# Patient Record
Sex: MALE | Race: ASIAN | NOT HISPANIC OR LATINO | URBAN - METROPOLITAN AREA
[De-identification: names, ages, dates, MRNs, and addresses within clinical notes are randomized per-mention and may not be internally consistent; named-entity substitution may affect disease eponyms.]

---

## 2023-12-27 ENCOUNTER — EMERGENCY (EMERGENCY)
Facility: HOSPITAL | Age: 73
LOS: 1 days | Discharge: HOME | End: 2023-12-27
Attending: EMERGENCY MEDICINE | Admitting: STUDENT IN AN ORGANIZED HEALTH CARE EDUCATION/TRAINING PROGRAM
Payer: MEDICAID

## 2023-12-27 VITALS
RESPIRATION RATE: 16 BRPM | TEMPERATURE: 98 F | SYSTOLIC BLOOD PRESSURE: 131 MMHG | HEART RATE: 58 BPM | OXYGEN SATURATION: 98 % | DIASTOLIC BLOOD PRESSURE: 69 MMHG

## 2023-12-27 LAB
ALBUMIN SERPL ELPH-MCNC: 4.3 G/DL — SIGNIFICANT CHANGE UP (ref 3.3–5)
ALBUMIN SERPL ELPH-MCNC: 4.3 G/DL — SIGNIFICANT CHANGE UP (ref 3.3–5)
ALP SERPL-CCNC: 55 U/L — SIGNIFICANT CHANGE UP (ref 40–120)
ALP SERPL-CCNC: 55 U/L — SIGNIFICANT CHANGE UP (ref 40–120)
ALT FLD-CCNC: 35 U/L — SIGNIFICANT CHANGE UP (ref 4–41)
ALT FLD-CCNC: 35 U/L — SIGNIFICANT CHANGE UP (ref 4–41)
ANION GAP SERPL CALC-SCNC: 9 MMOL/L — SIGNIFICANT CHANGE UP (ref 7–14)
ANION GAP SERPL CALC-SCNC: 9 MMOL/L — SIGNIFICANT CHANGE UP (ref 7–14)
APPEARANCE UR: CLEAR — SIGNIFICANT CHANGE UP
APPEARANCE UR: CLEAR — SIGNIFICANT CHANGE UP
AST SERPL-CCNC: 28 U/L — SIGNIFICANT CHANGE UP (ref 4–40)
AST SERPL-CCNC: 28 U/L — SIGNIFICANT CHANGE UP (ref 4–40)
BASE EXCESS BLDV CALC-SCNC: 1.9 MMOL/L — SIGNIFICANT CHANGE UP (ref -2–3)
BASE EXCESS BLDV CALC-SCNC: 1.9 MMOL/L — SIGNIFICANT CHANGE UP (ref -2–3)
BASOPHILS # BLD AUTO: 0.05 K/UL — SIGNIFICANT CHANGE UP (ref 0–0.2)
BASOPHILS # BLD AUTO: 0.05 K/UL — SIGNIFICANT CHANGE UP (ref 0–0.2)
BASOPHILS NFR BLD AUTO: 0.8 % — SIGNIFICANT CHANGE UP (ref 0–2)
BASOPHILS NFR BLD AUTO: 0.8 % — SIGNIFICANT CHANGE UP (ref 0–2)
BILIRUB SERPL-MCNC: 0.5 MG/DL — SIGNIFICANT CHANGE UP (ref 0.2–1.2)
BILIRUB SERPL-MCNC: 0.5 MG/DL — SIGNIFICANT CHANGE UP (ref 0.2–1.2)
BILIRUB UR-MCNC: NEGATIVE — SIGNIFICANT CHANGE UP
BILIRUB UR-MCNC: NEGATIVE — SIGNIFICANT CHANGE UP
BLOOD GAS VENOUS COMPREHENSIVE RESULT: SIGNIFICANT CHANGE UP
BLOOD GAS VENOUS COMPREHENSIVE RESULT: SIGNIFICANT CHANGE UP
BUN SERPL-MCNC: 8 MG/DL — SIGNIFICANT CHANGE UP (ref 7–23)
BUN SERPL-MCNC: 8 MG/DL — SIGNIFICANT CHANGE UP (ref 7–23)
CALCIUM SERPL-MCNC: 9.2 MG/DL — SIGNIFICANT CHANGE UP (ref 8.4–10.5)
CALCIUM SERPL-MCNC: 9.2 MG/DL — SIGNIFICANT CHANGE UP (ref 8.4–10.5)
CHLORIDE BLDV-SCNC: 105 MMOL/L — SIGNIFICANT CHANGE UP (ref 96–108)
CHLORIDE BLDV-SCNC: 105 MMOL/L — SIGNIFICANT CHANGE UP (ref 96–108)
CHLORIDE SERPL-SCNC: 104 MMOL/L — SIGNIFICANT CHANGE UP (ref 98–107)
CHLORIDE SERPL-SCNC: 104 MMOL/L — SIGNIFICANT CHANGE UP (ref 98–107)
CO2 BLDV-SCNC: 30.3 MMOL/L — HIGH (ref 22–26)
CO2 BLDV-SCNC: 30.3 MMOL/L — HIGH (ref 22–26)
CO2 SERPL-SCNC: 27 MMOL/L — SIGNIFICANT CHANGE UP (ref 22–31)
CO2 SERPL-SCNC: 27 MMOL/L — SIGNIFICANT CHANGE UP (ref 22–31)
COLOR SPEC: YELLOW — SIGNIFICANT CHANGE UP
COLOR SPEC: YELLOW — SIGNIFICANT CHANGE UP
CREAT SERPL-MCNC: 0.95 MG/DL — SIGNIFICANT CHANGE UP (ref 0.5–1.3)
CREAT SERPL-MCNC: 0.95 MG/DL — SIGNIFICANT CHANGE UP (ref 0.5–1.3)
DIFF PNL FLD: NEGATIVE — SIGNIFICANT CHANGE UP
DIFF PNL FLD: NEGATIVE — SIGNIFICANT CHANGE UP
EGFR: 85 ML/MIN/1.73M2 — SIGNIFICANT CHANGE UP
EGFR: 85 ML/MIN/1.73M2 — SIGNIFICANT CHANGE UP
EOSINOPHIL # BLD AUTO: 0.06 K/UL — SIGNIFICANT CHANGE UP (ref 0–0.5)
EOSINOPHIL # BLD AUTO: 0.06 K/UL — SIGNIFICANT CHANGE UP (ref 0–0.5)
EOSINOPHIL NFR BLD AUTO: 0.9 % — SIGNIFICANT CHANGE UP (ref 0–6)
EOSINOPHIL NFR BLD AUTO: 0.9 % — SIGNIFICANT CHANGE UP (ref 0–6)
GAS PNL BLDV: 136 MMOL/L — SIGNIFICANT CHANGE UP (ref 136–145)
GAS PNL BLDV: 136 MMOL/L — SIGNIFICANT CHANGE UP (ref 136–145)
GLUCOSE BLDV-MCNC: 96 MG/DL — SIGNIFICANT CHANGE UP (ref 70–99)
GLUCOSE BLDV-MCNC: 96 MG/DL — SIGNIFICANT CHANGE UP (ref 70–99)
GLUCOSE SERPL-MCNC: 101 MG/DL — HIGH (ref 70–99)
GLUCOSE SERPL-MCNC: 101 MG/DL — HIGH (ref 70–99)
GLUCOSE UR QL: NEGATIVE MG/DL — SIGNIFICANT CHANGE UP
GLUCOSE UR QL: NEGATIVE MG/DL — SIGNIFICANT CHANGE UP
HCO3 BLDV-SCNC: 29 MMOL/L — SIGNIFICANT CHANGE UP (ref 22–29)
HCO3 BLDV-SCNC: 29 MMOL/L — SIGNIFICANT CHANGE UP (ref 22–29)
HCT VFR BLD CALC: 44.8 % — SIGNIFICANT CHANGE UP (ref 39–50)
HCT VFR BLD CALC: 44.8 % — SIGNIFICANT CHANGE UP (ref 39–50)
HCT VFR BLDA CALC: 45 % — SIGNIFICANT CHANGE UP (ref 39–51)
HCT VFR BLDA CALC: 45 % — SIGNIFICANT CHANGE UP (ref 39–51)
HGB BLD CALC-MCNC: 15.1 G/DL — SIGNIFICANT CHANGE UP (ref 12.6–17.4)
HGB BLD CALC-MCNC: 15.1 G/DL — SIGNIFICANT CHANGE UP (ref 12.6–17.4)
HGB BLD-MCNC: 14.8 G/DL — SIGNIFICANT CHANGE UP (ref 13–17)
HGB BLD-MCNC: 14.8 G/DL — SIGNIFICANT CHANGE UP (ref 13–17)
IANC: 4.26 K/UL — SIGNIFICANT CHANGE UP (ref 1.8–7.4)
IANC: 4.26 K/UL — SIGNIFICANT CHANGE UP (ref 1.8–7.4)
IMM GRANULOCYTES NFR BLD AUTO: 0.3 % — SIGNIFICANT CHANGE UP (ref 0–0.9)
IMM GRANULOCYTES NFR BLD AUTO: 0.3 % — SIGNIFICANT CHANGE UP (ref 0–0.9)
KETONES UR-MCNC: NEGATIVE MG/DL — SIGNIFICANT CHANGE UP
KETONES UR-MCNC: NEGATIVE MG/DL — SIGNIFICANT CHANGE UP
LACTATE BLDV-MCNC: 1 MMOL/L — SIGNIFICANT CHANGE UP (ref 0.5–2)
LACTATE BLDV-MCNC: 1 MMOL/L — SIGNIFICANT CHANGE UP (ref 0.5–2)
LEUKOCYTE ESTERASE UR-ACNC: NEGATIVE — SIGNIFICANT CHANGE UP
LEUKOCYTE ESTERASE UR-ACNC: NEGATIVE — SIGNIFICANT CHANGE UP
LYMPHOCYTES # BLD AUTO: 1.57 K/UL — SIGNIFICANT CHANGE UP (ref 1–3.3)
LYMPHOCYTES # BLD AUTO: 1.57 K/UL — SIGNIFICANT CHANGE UP (ref 1–3.3)
LYMPHOCYTES # BLD AUTO: 24.4 % — SIGNIFICANT CHANGE UP (ref 13–44)
LYMPHOCYTES # BLD AUTO: 24.4 % — SIGNIFICANT CHANGE UP (ref 13–44)
MCHC RBC-ENTMCNC: 30.3 PG — SIGNIFICANT CHANGE UP (ref 27–34)
MCHC RBC-ENTMCNC: 30.3 PG — SIGNIFICANT CHANGE UP (ref 27–34)
MCHC RBC-ENTMCNC: 33 GM/DL — SIGNIFICANT CHANGE UP (ref 32–36)
MCHC RBC-ENTMCNC: 33 GM/DL — SIGNIFICANT CHANGE UP (ref 32–36)
MCV RBC AUTO: 91.6 FL — SIGNIFICANT CHANGE UP (ref 80–100)
MCV RBC AUTO: 91.6 FL — SIGNIFICANT CHANGE UP (ref 80–100)
MONOCYTES # BLD AUTO: 0.48 K/UL — SIGNIFICANT CHANGE UP (ref 0–0.9)
MONOCYTES # BLD AUTO: 0.48 K/UL — SIGNIFICANT CHANGE UP (ref 0–0.9)
MONOCYTES NFR BLD AUTO: 7.5 % — SIGNIFICANT CHANGE UP (ref 2–14)
MONOCYTES NFR BLD AUTO: 7.5 % — SIGNIFICANT CHANGE UP (ref 2–14)
NEUTROPHILS # BLD AUTO: 4.26 K/UL — SIGNIFICANT CHANGE UP (ref 1.8–7.4)
NEUTROPHILS # BLD AUTO: 4.26 K/UL — SIGNIFICANT CHANGE UP (ref 1.8–7.4)
NEUTROPHILS NFR BLD AUTO: 66.1 % — SIGNIFICANT CHANGE UP (ref 43–77)
NEUTROPHILS NFR BLD AUTO: 66.1 % — SIGNIFICANT CHANGE UP (ref 43–77)
NITRITE UR-MCNC: NEGATIVE — SIGNIFICANT CHANGE UP
NITRITE UR-MCNC: NEGATIVE — SIGNIFICANT CHANGE UP
NRBC # BLD: 0 /100 WBCS — SIGNIFICANT CHANGE UP (ref 0–0)
NRBC # BLD: 0 /100 WBCS — SIGNIFICANT CHANGE UP (ref 0–0)
NRBC # FLD: 0 K/UL — SIGNIFICANT CHANGE UP (ref 0–0)
NRBC # FLD: 0 K/UL — SIGNIFICANT CHANGE UP (ref 0–0)
PCO2 BLDV: 52 MMHG — SIGNIFICANT CHANGE UP (ref 42–55)
PCO2 BLDV: 52 MMHG — SIGNIFICANT CHANGE UP (ref 42–55)
PH BLDV: 7.35 — SIGNIFICANT CHANGE UP (ref 7.32–7.43)
PH BLDV: 7.35 — SIGNIFICANT CHANGE UP (ref 7.32–7.43)
PH UR: 8 — SIGNIFICANT CHANGE UP (ref 5–8)
PH UR: 8 — SIGNIFICANT CHANGE UP (ref 5–8)
PLATELET # BLD AUTO: 214 K/UL — SIGNIFICANT CHANGE UP (ref 150–400)
PLATELET # BLD AUTO: 214 K/UL — SIGNIFICANT CHANGE UP (ref 150–400)
PO2 BLDV: 30 MMHG — SIGNIFICANT CHANGE UP (ref 25–45)
PO2 BLDV: 30 MMHG — SIGNIFICANT CHANGE UP (ref 25–45)
POTASSIUM BLDV-SCNC: 4.8 MMOL/L — SIGNIFICANT CHANGE UP (ref 3.5–5.1)
POTASSIUM BLDV-SCNC: 4.8 MMOL/L — SIGNIFICANT CHANGE UP (ref 3.5–5.1)
POTASSIUM SERPL-MCNC: 4.6 MMOL/L — SIGNIFICANT CHANGE UP (ref 3.5–5.3)
POTASSIUM SERPL-MCNC: 4.6 MMOL/L — SIGNIFICANT CHANGE UP (ref 3.5–5.3)
POTASSIUM SERPL-SCNC: 4.6 MMOL/L — SIGNIFICANT CHANGE UP (ref 3.5–5.3)
POTASSIUM SERPL-SCNC: 4.6 MMOL/L — SIGNIFICANT CHANGE UP (ref 3.5–5.3)
PROT SERPL-MCNC: 7.4 G/DL — SIGNIFICANT CHANGE UP (ref 6–8.3)
PROT SERPL-MCNC: 7.4 G/DL — SIGNIFICANT CHANGE UP (ref 6–8.3)
PROT UR-MCNC: SIGNIFICANT CHANGE UP MG/DL
PROT UR-MCNC: SIGNIFICANT CHANGE UP MG/DL
RBC # BLD: 4.89 M/UL — SIGNIFICANT CHANGE UP (ref 4.2–5.8)
RBC # BLD: 4.89 M/UL — SIGNIFICANT CHANGE UP (ref 4.2–5.8)
RBC # FLD: 12.9 % — SIGNIFICANT CHANGE UP (ref 10.3–14.5)
RBC # FLD: 12.9 % — SIGNIFICANT CHANGE UP (ref 10.3–14.5)
SAO2 % BLDV: 39.4 % — LOW (ref 67–88)
SAO2 % BLDV: 39.4 % — LOW (ref 67–88)
SODIUM SERPL-SCNC: 140 MMOL/L — SIGNIFICANT CHANGE UP (ref 135–145)
SODIUM SERPL-SCNC: 140 MMOL/L — SIGNIFICANT CHANGE UP (ref 135–145)
SP GR SPEC: 1.02 — SIGNIFICANT CHANGE UP (ref 1–1.03)
SP GR SPEC: 1.02 — SIGNIFICANT CHANGE UP (ref 1–1.03)
TROPONIN T, HIGH SENSITIVITY RESULT: 7 NG/L — SIGNIFICANT CHANGE UP
TROPONIN T, HIGH SENSITIVITY RESULT: 7 NG/L — SIGNIFICANT CHANGE UP
UROBILINOGEN FLD QL: 1 MG/DL — SIGNIFICANT CHANGE UP (ref 0.2–1)
UROBILINOGEN FLD QL: 1 MG/DL — SIGNIFICANT CHANGE UP (ref 0.2–1)
WBC # BLD: 6.44 K/UL — SIGNIFICANT CHANGE UP (ref 3.8–10.5)
WBC # BLD: 6.44 K/UL — SIGNIFICANT CHANGE UP (ref 3.8–10.5)
WBC # FLD AUTO: 6.44 K/UL — SIGNIFICANT CHANGE UP (ref 3.8–10.5)
WBC # FLD AUTO: 6.44 K/UL — SIGNIFICANT CHANGE UP (ref 3.8–10.5)

## 2023-12-27 PROCEDURE — 99223 1ST HOSP IP/OBS HIGH 75: CPT

## 2023-12-27 PROCEDURE — 70498 CT ANGIOGRAPHY NECK: CPT | Mod: 26,MA

## 2023-12-27 PROCEDURE — 70496 CT ANGIOGRAPHY HEAD: CPT | Mod: 26,MA

## 2023-12-27 PROCEDURE — 93010 ELECTROCARDIOGRAM REPORT: CPT

## 2023-12-27 PROCEDURE — 70450 CT HEAD/BRAIN W/O DYE: CPT | Mod: 26,59,MA

## 2023-12-27 RX ORDER — AMLODIPINE BESYLATE 2.5 MG/1
2.5 TABLET ORAL DAILY
Refills: 0 | Status: DISCONTINUED | OUTPATIENT
Start: 2023-12-27 | End: 2023-12-30

## 2023-12-27 RX ORDER — METOCLOPRAMIDE HCL 10 MG
10 TABLET ORAL EVERY 6 HOURS
Refills: 0 | Status: DISCONTINUED | OUTPATIENT
Start: 2023-12-27 | End: 2023-12-30

## 2023-12-27 RX ORDER — MECLIZINE HCL 12.5 MG
25 TABLET ORAL ONCE
Refills: 0 | Status: COMPLETED | OUTPATIENT
Start: 2023-12-27 | End: 2023-12-27

## 2023-12-27 RX ORDER — ACETAMINOPHEN 500 MG
975 TABLET ORAL EVERY 6 HOURS
Refills: 0 | Status: DISCONTINUED | OUTPATIENT
Start: 2023-12-27 | End: 2023-12-30

## 2023-12-27 RX ORDER — FINASTERIDE 5 MG/1
5 TABLET, FILM COATED ORAL DAILY
Refills: 0 | Status: DISCONTINUED | OUTPATIENT
Start: 2023-12-27 | End: 2023-12-30

## 2023-12-27 RX ORDER — METOCLOPRAMIDE HCL 10 MG
10 TABLET ORAL ONCE
Refills: 0 | Status: COMPLETED | OUTPATIENT
Start: 2023-12-27 | End: 2023-12-27

## 2023-12-27 RX ORDER — MECLIZINE HCL 12.5 MG
12.5 TABLET ORAL THREE TIMES A DAY
Refills: 0 | Status: DISCONTINUED | OUTPATIENT
Start: 2023-12-27 | End: 2023-12-30

## 2023-12-27 RX ORDER — TAMSULOSIN HYDROCHLORIDE 0.4 MG/1
0.4 CAPSULE ORAL AT BEDTIME
Refills: 0 | Status: DISCONTINUED | OUTPATIENT
Start: 2023-12-27 | End: 2023-12-30

## 2023-12-27 RX ORDER — LISINOPRIL 2.5 MG/1
2.5 TABLET ORAL DAILY
Refills: 0 | Status: DISCONTINUED | OUTPATIENT
Start: 2023-12-27 | End: 2023-12-30

## 2023-12-27 RX ORDER — ATORVASTATIN CALCIUM 80 MG/1
40 TABLET, FILM COATED ORAL AT BEDTIME
Refills: 0 | Status: DISCONTINUED | OUTPATIENT
Start: 2023-12-27 | End: 2023-12-30

## 2023-12-27 RX ORDER — CLOPIDOGREL BISULFATE 75 MG/1
75 TABLET, FILM COATED ORAL DAILY
Refills: 0 | Status: DISCONTINUED | OUTPATIENT
Start: 2023-12-27 | End: 2023-12-30

## 2023-12-27 RX ADMIN — Medication 10 MILLIGRAM(S): at 14:28

## 2023-12-27 RX ADMIN — Medication 25 MILLIGRAM(S): at 12:28

## 2023-12-27 NOTE — ED PROVIDER NOTE - PROGRESS NOTE DETAILS
BESSIE Castillo: CTa head/neck without acute findings, pt continues to have dizziness, will trial reglan. Spoke with neuro will consult in ED given persistent dizziness hx of CVA BESSIE Castillo: Neuro recommending CDU for MRI brain and IACs, pt signed out awaiting CDU evaluation

## 2023-12-27 NOTE — ED ADULT NURSE REASSESSMENT NOTE - NS ED NURSE REASSESS COMMENT FT1
Pt received at change of shift. Pt laying in bed, NAD, respirations even and unlabored. Report given to CDU Danica IBARRA, awaiting transport by PCA.

## 2023-12-27 NOTE — ED ADULT NURSE NOTE - OBJECTIVE STATEMENT
Beata RN: Pt received to 6a, awake and alert, A&OX4, ambulatory. C/o dizziness x2 weeks, worsening yesterday. Reports increased dizziness and difficulty projecting his voice while praying. Reports feeling better at this time but has had intermittent dizziness today. Recent prescribed Meclizine for dizziness. No neuro deficits. Respirations even and unlabored. Denies CP, SOB, N/V, HA,  palpitations, blurry vision. 18G IV placed to R AC. Bed in lowest position, call bell within reach. Safety maintained. Report given to primary RN.

## 2023-12-27 NOTE — ED PROVIDER NOTE - OBJECTIVE STATEMENT
73yM w/pmhx CVA (6 months ago), CAD w/stents on plavix, HTN, HLD, pre-DM (on metformin), BPH presenting to the ED with dizziness. Son at bedside is providing translation per patient request. States yesterday when pt went to stand and speak during prayer he suddenly felt dizzy and generalized weakness. Since then pt has been experiencing dizziness. States since CVA 6 months ago he has intermittent dizziness and was prescribed meclizine which he has ran out of. States he feels pressure to the top of his head. Also he reports burning chest pain with eating and burning to the bottom of his feet. Pt denies fever/chills, neck pain, ear pain, URI symptoms, sob, palpitations, numbness/tingling, difficulty speaking or swallowing, recent illness or any other concerns.

## 2023-12-27 NOTE — ED PROVIDER NOTE - ATTENDING APP SHARED VISIT CONTRIBUTION OF CARE
Dr. Max:  I performed a face to face bedside interview with patient regarding history of present illness, review of symptoms and past medical history. I completed an independent physical exam.  I have discussed patient's plan of care with PA.   I agree with note as stated above, having amended the EMR as needed to reflect my findings.   This includes HISTORY OF PRESENT ILLNESS, HIV, PAST MEDICAL/SURGICAL/FAMILY/SOCIAL HISTORY, ALLERGIES AND HOME MEDICATIONS, REVIEW OF SYSTEMS, PHYSICAL EXAM, and any PROGRESS NOTES during the time I functioned as the attending physician for this patient.    73M h/[place] was [place] to [place] and symptoms resolved), CAD on Plavix, HTN, HLD, pre-DM, BPH, presents c/o dizziness which he describes as worse when standing up and feels like room spinning.  States he's had intermittent dizziness since his stroke, was prescribed meclizine.  Also c/o pressure sensation to top of head.    Exam:  - nad  - rrr  - ctab  - abd soft ntnd  - no focal neuro deficits    A/P  - dizziness consistent with vertigo, eval peripheral vs centra  - cbc, cmp, trop, CTA head/neck  - neuro consult

## 2023-12-27 NOTE — ED ADULT NURSE REASSESSMENT NOTE - NS ED NURSE REASSESS COMMENT FT1
Pt A+Ox4.  Pt [place]ian speaking but son at bedside and he wishes for him to translate.  Pt in NAD.  Pt lungs clear, equal and unlabored. Pt given lunch and he ate a full tray.  Fall precautions maintained.  Will continue to monitor. Pt A+Ox4.  Pt Hebrew speaking but son at bedside and he wishes for him to translate.  Pt in NAD.  Pt lungs clear, equal and unlabored. Pt given lunch and he ate a full tray.  Fall precautions maintained.  Will continue to monitor.

## 2023-12-27 NOTE — ED PROVIDER NOTE - PHYSICAL EXAMINATION
neuro: A&Ox3, PERRL, CN II-VII intact, sensation intact and equal b/l, strength 5/5 in all extremities, normal rapid alternating movements, cautious gait with assistance

## 2023-12-27 NOTE — ED CDU PROVIDER INITIAL DAY NOTE - NSICDXPASTMEDICALHX_GEN_ALL_CORE_FT
PAST MEDICAL HISTORY:  CAD (coronary artery disease)     CVA (cerebrovascular accident)     HLD (hyperlipidemia)     HTN (hypertension)      PAST MEDICAL HISTORY:  CAD (coronary artery disease)     CVA (cerebrovascular accident)     History of BPH     HLD (hyperlipidemia)     HTN (hypertension)

## 2023-12-27 NOTE — ED CDU PROVIDER INITIAL DAY NOTE - PHYSICAL EXAMINATION
CONSTITUTIONAL: Well-appearing; well-nourished; in no apparent distress. Non-toxic appearing.   NEURO: Alert, oriented x 3. Gait steady without assistance. No facial droop. Tongue protrudes midline. Strength to upper and lower extremities 5/5. No pronator drift.   PSYCH: Mood appropriate. Thought processes intact.   NECK: Supple  CARD: Regular rate and rhythm, no murmurs  RESP: No accessory muscle use; breath sounds clear and equal bilaterally; no wheezes, rhonchi, or rales     ABD: Soft; non-distended; non-tender. No guarding or rebound.   MUSCULOSKELETAL/EXTREMITIES: FROM in all four extremities; no extremity edema.  SKIN: Warm; dry; no apparent lesions or exudate

## 2023-12-27 NOTE — ED PROVIDER NOTE - NSICDXPASTMEDICALHX_GEN_ALL_CORE_FT
PAST MEDICAL HISTORY:  CAD (coronary artery disease)     CVA (cerebrovascular accident)     HLD (hyperlipidemia)     HTN (hypertension)

## 2023-12-27 NOTE — CONSULT NOTE ADULT - ATTENDING COMMENTS
Seen and examined on rounds     chart and imaging reviewed    73m prior stroke in [facility]   is on plavix for cardiac stents, statin  presents with worsened vertigo after getting up from praying   not helped by meclizine  this morning worsened by rolling over in stretcher  MRI reviewed personally no acute infarct or CP angle lesion/IAC lesion    On exam he is alert, oriented, no aphasia  no nystagmus on my exam, remainder of CN intact  motor and sensory function in detail intact  stands and walks on own  coordination intact  DTRs diminished    peripheral vertigo  discussed vestib rehab  also discussed secondary stroke prevention, we will set him up with outpatient neuro  continue plavix   ok to dc Seen and examined on rounds     chart and imaging reviewed    73m prior stroke in [facility]   is on plavix for cardiac stents, statin  presents with worsened vertigo after getting up from praying   not helped by meclizine  this morning worsened by rolling over in stretcher  MRI reviewed personally no acute infarct or CP angle lesion/IAC lesion    On exam he is alert, oriented, no aphasia  no nystagmus on my exam, remainder of CN intact  motor and sensory function in detail intact  stands and walks on own  coordination intact  DTRs diminished    peripheral vertigo  discussed vestib rehab  also discussed secondary stroke prevention, we will set him up with outpatient neuro  continue plavix   ok to dc

## 2023-12-27 NOTE — ED CDU PROVIDER INITIAL DAY NOTE - CLINICAL SUMMARY MEDICAL DECISION MAKING FREE TEXT BOX
74 y/o man with a PMHx significant for stroke (L hemipareis, speech disturbance) in June 2023 with no residual deficits, CAD s/p stents on plavix, HTN, HLD, pre-DM, BPH, chronic intermittent dizziness on Meclizine, who presents with acute worsening of dizziness. In the emergency department patient underwent lab analysis and CTs.  Labs unremarkable.  CTA head/neck without acute intracranial hemorrhage, mass effect, or shift of midline structures nor large vessel acute occlusion or major stenosis.  Patient placed in CDU for MRI Brain and IACs, tele monitoring, and frequent reassessment.

## 2023-12-27 NOTE — CONSULT NOTE ADULT - ASSESSMENT
Mr. MENDOZA is a 73y (1950) man with a PMHx significant for stroke (L hemipareis, speech disturbance) in June 2023 with no residual deficits, CAD s/p stents on plavix, HTN, HLD, pre-DM, BPH, chronic intermittent dizziness on Meclizine, who presents with acute worsening of dizziness.    Impression:    1. Intermittent and recurrent episodes of dizziness associated with gait instability with concern for acute vestibular syndrome. Given exam and history, appears to be peripheral etiology of vertigo, however patient does have vascular risk factors. Will also need to consider orthostatic hypotension as cause of potential causes dizziness in setting of polypharmacy   2. Reported history of stroke 6 months ago with left sided hemiparesis and speech disturbance, symptoms completely resolved per patient     Recommendations:    [] ???CDU for MRI brain w/ and w/o IAC protocol  [] Meclizine 12.5mg TID PRN  [] EKG, telemetry, orthostatic vitals  [] Continue plavix 75mg daily and atorvastatin 40mg qhs for secondary stroke prevention given history of stroke   [] PT/OT as tolerated   [] Patient will likely benefit from outpatient vestibular rehab. Can   [] Follow up with Neurology as outpatient. Patient can follow up with Dr. Chauncey Massey after discharge. Please instruct the patient/family to call [phone] to schedule an appointment within the next 1-2 weeks. Office is located at [address] [place] [address].      Case to be seen and discussed with Neurology attending Dr. Gillespie, please await attending attestation.  Mr. MENDOZA is a 73y (1950) man with a PMHx significant for stroke (L hemipareis, speech disturbance) in June 2023 with no residual deficits, CAD s/p stents on plavix, HTN, HLD, pre-DM, BPH, chronic intermittent dizziness on Meclizine, who presents with acute worsening of dizziness.    Impression:    1. Intermittent and recurrent episodes of dizziness associated with gait instability with concern for acute vestibular syndrome. Given exam and history, appears to be peripheral etiology of vertigo, however patient does have vascular risk factors. Will also need to consider orthostatic hypotension as cause of potential causes dizziness in setting of polypharmacy   2. Reported history of stroke 6 months ago with left sided hemiparesis and speech disturbance, symptoms completely resolved per patient     Recommendations:    [] ???CDU for MRI brain w/ and w/o IAC protocol  [] Meclizine 12.5mg TID PRN  [] EKG, telemetry, orthostatic vitals  [] Continue plavix 75mg daily and atorvastatin 40mg qhs for secondary stroke prevention given history of stroke   [] PT/OT as tolerated   [] Patient will likely benefit from outpatient vestibular rehab. Can   [] Follow up with Neurology as outpatient. Patient can follow up with Dr. Chauncey Massey after discharge. Please instruct the patient/family to call [phone] to schedule an appointment within the next 1-2 weeks. Office is located at [address] [place] [address].      Case to be seen and discussed with Neurology attending Dr. Gillespie, please await attending attestation.  Mr. MENDOZA is a 73y (1950) man with a PMHx significant for stroke (L hemipareis, speech disturbance) in June 2023 with no residual deficits, CAD s/p stents on plavix, HTN, HLD, pre-DM, BPH, chronic intermittent dizziness on Meclizine, who presents with acute worsening of dizziness.    Impression:    1. Intermittent and recurrent episodes of dizziness associated with gait instability with concern for acute vestibular syndrome. Given exam and history, appears to be peripheral etiology of vertigo, however patient does have vascular risk factors. Will also need to consider orthostatic hypotension as cause of potential causes dizziness in setting of polypharmacy   2. Reported history of stroke 6 months ago with left sided hemiparesis and speech disturbance, symptoms completely resolved per patient     Recommendations:    [] ???CDU for MRI brain w/ and w/o IAC protocol  [] Meclizine 12.5mg TID PRN  [] EKG, telemetry, orthostatic vitals  [] Continue plavix 75mg daily and atorvastatin 40mg qhs for secondary stroke prevention given history of stroke   [] PT/OT as tolerated   [] Patient will likely benefit from outpatient vestibular rehab. Can follow at [facility], patient can call [phone] to make an appointment.   [] Follow up with Neurology as outpatient. Patient can follow up with Dr. Chauncey Massey after discharge. Please instruct the patient/family to call [phone] to schedule an appointment within the next 1-2 weeks. Office is located at [address] [place] [address], [place] [address].      Case to be seen and discussed with Neurology attending Dr. Gillespie, please await attending attestation.  Mr. MENDOZA is a 73y (1950) man with a PMHx significant for stroke (L hemipareis, speech disturbance) in June 2023 with no residual deficits, CAD s/p stents on plavix, HTN, HLD, pre-DM, BPH, chronic intermittent dizziness on Meclizine, who presents with acute worsening of dizziness.    Impression:    1. Intermittent and recurrent episodes of dizziness associated with gait instability with concern for acute vestibular syndrome. Given exam and history, appears to be peripheral etiology of vertigo, however patient does have vascular risk factors. Will also need to consider orthostatic hypotension as cause of potential causes dizziness in setting of polypharmacy   2. Reported history of stroke 6 months ago with left sided hemiparesis and speech disturbance, symptoms completely resolved per patient     Recommendations:    [] ???CDU for MRI brain w/ and w/o IAC protocol  [] Meclizine 12.5mg TID PRN  [] EKG, telemetry, orthostatic vitals  [] Continue plavix 75mg daily and atorvastatin 40mg qhs for secondary stroke prevention given history of stroke   [] PT/OT as tolerated   [] Patient will likely benefit from outpatient vestibular rehab. Can follow at [facility], patient can call [phone] to make an appointment.   [] Follow up with Neurology as outpatient. Patient can follow up with Dr. Chauncey Massey after discharge. Please instruct the patient/family to call [phone] to schedule an appointment within the next 1-2 weeks. Office is located at [address] [place] [address], [place] [address].      Case to be seen and discussed with Neurology attending Dr. Gillespie, please await attending attestation.  Mr. MENDOZA is a 73y (1950) man with a PMHx significant for stroke (L hemipareis, speech disturbance) in June 2023 with no residual deficits, CAD s/p stents on plavix, HTN, HLD, pre-DM, BPH, chronic intermittent dizziness on Meclizine, who presents with acute worsening of dizziness.    Impression:    1. Intermittent and recurrent episodes of dizziness associated with gait instability with concern for acute vestibular syndrome. Given exam and history, appears to be peripheral etiology of vertigo, however patient does have vascular risk factors. Will also need to consider orthostatic hypotension as cause of potential causes dizziness in setting of polypharmacy   2. Reported history of stroke 6 months ago with left sided hemiparesis and speech disturbance, symptoms completely resolved per patient     Recommendations:    [] CDU for MRI brain w/ and w/o IAC protocol  [] Meclizine 12.5mg TID PRN  [] EKG, telemetry, orthostatic vitals  [] Continue plavix 75mg daily and atorvastatin 40mg qhs for secondary stroke prevention given history of stroke   [] PT/OT as tolerated   [] Patient will likely benefit from outpatient vestibular rehab. Can follow at [facility], patient can call [phone] to make an appointment.   [] Follow up with Neurology as outpatient. Patient can follow up with Dr. Chauncey Massey after discharge. Please instruct the patient/family to call [phone] to schedule an appointment within the next 1-2 weeks. Office is located at [address] [place] [address].      Case discussed with Neurology attending Dr. Gillespie. To be seen on AM rounds, please await attending attestation.  Mr. MENDOZA is a 73y (1950) man with a PMHx significant for stroke (L hemipareis, speech disturbance) in June 2023 with no residual deficits, CAD s/p stents on plavix, HTN, HLD, pre-DM, BPH, chronic intermittent dizziness on Meclizine, who presents with acute worsening of dizziness.    Impression:    1. Intermittent and recurrent episodes of dizziness associated with gait instability with concern for acute vestibular syndrome. Given exam and history, appears to be peripheral etiology of vertigo, however patient does have vascular risk factors. Will also need to consider orthostatic hypotension as cause of potential causes dizziness in setting of polypharmacy   2. Reported history of stroke 6 months ago with left sided hemiparesis and speech disturbance, symptoms completely resolved per patient     Recommendations:    [] CDU for MRI brain w/ and w/o IAC protocol  [] Meclizine 12.5mg TID PRN  [] EKG, telemetry, orthostatic vitals  [] Continue plavix 75mg daily and atorvastatin 40mg qhs for secondary stroke prevention given history of stroke   [] PT/OT as tolerated   [] Patient will likely benefit from outpatient vestibular rehab. Can follow at [facility], patient can call [phone] to make an appointment.   [] Follow up with Neurology as outpatient. Patient can follow up with Dr. Chauncey Massey after discharge. Please instruct the patient/family to call [phone] to schedule an appointment within the next 1-2 weeks. Office is located at [address] [place] [address].      Case discussed with Neurology attending Dr. Gillespie. To be seen on AM rounds, please await attending attestation.  Mr. MENDOZA is a 73y (1950) man with a PMHx significant for stroke (L hemipareis, speech disturbance) in June 2023 with no residual deficits, CAD s/p stents on plavix, HTN, HLD, pre-DM, BPH, chronic intermittent dizziness on Meclizine, who presents with acute worsening of dizziness.      Impression:    1. Intermittent and recurrent episodes of dizziness associated with gait instability with concern for acute vestibular syndrome. Given exam and history, appears to be peripheral etiology of vertigo, however patient does have vascular risk factors. Will also need to consider orthostatic hypotension as cause of potential causes dizziness in setting of polypharmacy   2. Reported history of stroke 6 months ago with left sided hemiparesis and speech disturbance, symptoms completely resolved per patient     Recommendations:    [] CDU for MRI brain w/ and w/o IAC protocol  [] Meclizine 12.5mg TID PRN  [] EKG, telemetry, orthostatic vitals  [] Continue plavix 75mg daily and atorvastatin 40mg qhs for secondary stroke prevention given history of stroke   [] PT/OT as tolerated   [] Patient will likely benefit from outpatient vestibular rehab. Can follow at [facility], patient can call [phone] to make an appointment.   [] Follow up with Neurology as outpatient. Patient can follow up with Dr. Chauncey Massey after discharge. Please instruct the patient/family to call [phone] to schedule an appointment within the next 1-2 weeks. Office is located at [address] [place] [address].      Case discussed with Neurology attending Dr. Gillespie. To be seen on AM rounds, please await attending attestation.  Mr. MENDOZA is a 73y (1950) man with a PMHx significant for stroke (L hemipareis, speech disturbance) in June 2023 with no residual deficits, CAD s/p stents on plavix, HTN, HLD, pre-DM, BPH, chronic intermittent dizziness on Meclizine, who presents with acute worsening of dizziness.      Impression:    1. Intermittent and recurrent episodes of dizziness associated with gait instability with concern for acute vestibular syndrome. Given exam and history, appears to be peripheral etiology of vertigo, however patient does have vascular risk factors. Will also need to consider orthostatic hypotension as cause of potential causes dizziness in setting of polypharmacy   2. Reported history of stroke 6 months ago with left sided hemiparesis and speech disturbance, symptoms completely resolved per patient     Recommendations:    [] CDU for MRI brain w/ and w/o IAC protocol  [] Meclizine 12.5mg TID PRN  [] EKG, telemetry, orthostatic vitals  [] Continue plavix 75mg daily and atorvastatin 40mg qhs for secondary stroke prevention given history of stroke   [] PT/OT as tolerated   [] Patient will likely benefit from outpatient vestibular rehab. Can follow at [facility], patient can call [phone] to make an appointment.   [] Follow up with Neurology as outpatient. Patient can follow up with Dr. Chauncey Massey after discharge. Please instruct the patient/family to call [phone] to schedule an appointment within the next 1-2 weeks. Office is located at [address] [place] [address].      Case discussed with Neurology attending Dr. Gillespie. To be seen on AM rounds, please await attending attestation.  Mr. MENDOZA is a 73y (1950) man with a PMHx significant for stroke (L hemiparesis speech disturbance) in June 2023 with no residual deficits, CAD s/p stents on plavix, HTN, HLD, pre-DM, BPH, chronic intermittent dizziness on Meclizine, who presents with acute worsening of dizziness.      Impression:    1. Intermittent and recurrent episodes of dizziness associated with gait instability with concern for acute vestibular syndrome. Given exam and history, appears to be peripheral etiology of vertigo, however patient does have vascular risk factors, will need to evaluate for stroke.  Will also need to consider orthostatic hypotension as cause of potential causes dizziness in setting of polypharmacy   2. Reported history of stroke 6 months ago with left sided hemiparesis and speech disturbance, symptoms completely resolved per patient     Recommendations:    [] CDU for MRI brain w/ and w/o IAC protocol  [] Meclizine 12.5mg TID PRN  [] EKG, telemetry, orthostatic vitals  [] Continue plavix 75mg daily and atorvastatin 40mg qhs for secondary stroke prevention given history of stroke   [] Can send A1C, Lipid panel, TSH  [] PT/OT as tolerated  [] Follow up with Neurology as outpatient. Patient can follow up with Dr. Chauncey Massey (Stroke) after discharge for stroke work-up. Please instruct the patient/family to call [phone] to schedule an appointment within the next 1-2 weeks. Office is located at [address] [place] [address].  [] PCP follow up to optimize stroke risk factors, manage HTN and DM      Case discussed with Neurology attending Dr. Gillespie. To be seen on AM rounds, please await attending attestation.  Mr. MENDOZA is a 73y (1950) man with a PMHx significant for stroke (L hemiparesis speech disturbance) in June 2023 with no residual deficits, CAD s/p stents on plavix, HTN, HLD, pre-DM, BPH, chronic intermittent dizziness on Meclizine, who presents with acute worsening of dizziness.      Impression:    1. Intermittent and recurrent episodes of dizziness associated with gait instability with concern for acute vestibular syndrome. Given exam and history, appears to be peripheral etiology of vertigo, however patient does have vascular risk factors, will need to evaluate for stroke.  Will also need to consider orthostatic hypotension as cause of potential causes dizziness in setting of polypharmacy   2. Reported history of stroke 6 months ago with left sided hemiparesis and speech disturbance, symptoms completely resolved per patient     Recommendations:    [] CDU for MRI brain w/ and w/o IAC protocol  [] Meclizine 12.5mg TID PRN  [] EKG, telemetry, orthostatic vitals  [] Continue plavix 75mg daily and atorvastatin 40mg qhs for secondary stroke prevention given history of stroke   [] Can send A1C, Lipid panel, TSH  [] PT/OT as tolerated  [] Follow up with Neurology as outpatient. Patient can follow up with Dr. Chauncey Massey (Stroke) after discharge for stroke work-up. Please instruct the patient/family to call [phone] to schedule an appointment within the next 1-2 weeks. Office is located at [address] [place] [address].  [] PCP follow up to optimize stroke risk factors, manage HTN and DM      Case discussed with Neurology attending Dr. Gillespie. To be seen on AM rounds, please await attending attestation.  Mr. MENDOZA is a 73y (1950) man with a PMHx significant for stroke (L hemiparesis speech disturbance) in June 2023 with no residual deficits, CAD s/p stents on plavix, HTN, HLD, pre-DM, BPH, chronic intermittent dizziness on Meclizine, who presents with acute worsening of dizziness.      Impression:    1. Intermittent and recurrent episodes of dizziness associated with gait instability with concern for acute vestibular syndrome. Given exam and history, appears to be peripheral etiology of vertigo, however patient does have vascular risk factors, will need to evaluate for stroke.  Will also need to consider orthostatic hypotension as cause of potential causes dizziness in setting of polypharmacy   2. Reported history of stroke 6 months ago with left sided hemiparesis and speech disturbance, symptoms completely resolved per patient     Recommendations:    [] CDU for MRI brain w/ and w/o IAC protocol  [] Meclizine 12.5mg TID PRN  [] EKG, telemetry, orthostatic vitals  [] Continue plavix 75mg daily and atorvastatin 40mg qhs for secondary stroke prevention given history of stroke   [] Can send A1C, Lipid panel, TSH  [] PT/OT as tolerated. Will benefit from inpatient [facility] if able  [] Outpatient vestibular rehab- Can provide referral to [facility]  [] Follow up with Neurology as outpatient. Patient can follow up with Dr. Chauncey Massey (Stroke) after discharge for stroke work-up. Please instruct the patient/family to call [phone] to schedule an appointment within the next 1-2 weeks. Office is located at [address] [facility], [place] [address].  [] PCP follow up to optimize stroke risk factors, manage HTN and DM      Case discussed with Neurology attending Dr. Gillespie. To be seen on AM rounds, please await attending attestation.  Mr. MENDOZA is a 73y (1950) man with a PMHx significant for stroke (L hemiparesis speech disturbance) in June 2023 with no residual deficits, CAD s/p stents on plavix, HTN, HLD, pre-DM, BPH, chronic intermittent dizziness on Meclizine, who presents with acute worsening of dizziness.      Impression:    1. Intermittent and recurrent episodes of dizziness associated with gait instability with concern for acute vestibular syndrome. Given exam and history, appears to be peripheral etiology of vertigo, however patient does have vascular risk factors, will need to evaluate for stroke.  Will also need to consider orthostatic hypotension as cause of potential causes dizziness in setting of polypharmacy   2. Reported history of stroke 6 months ago with left sided hemiparesis and speech disturbance, symptoms completely resolved per patient     Recommendations:    [] CDU for MRI brain w/ and w/o IAC protocol  [] Meclizine 12.5mg TID PRN  [] EKG, telemetry, orthostatic vitals  [] Continue plavix 75mg daily and atorvastatin 40mg qhs for secondary stroke prevention given history of stroke   [] Can send A1C, Lipid panel, TSH  [] PT/OT as tolerated. Will benefit from inpatient [facility] if able  [] Outpatient vestibular rehab- Can provide referral to [facility]  [] Follow up with Neurology as outpatient. Patient can follow up with Dr. Chauncey Massey (Stroke) after discharge for stroke work-up. Please instruct the patient/family to call [phone] to schedule an appointment within the next 1-2 weeks. Office is located at [address] [facility], [place] [address].  [] PCP follow up to optimize stroke risk factors, manage HTN and DM      Case discussed with Neurology attending Dr. Gillespie. To be seen on AM rounds, please await attending attestation.

## 2023-12-27 NOTE — CONSULT NOTE ADULT - SUBJECTIVE AND OBJECTIVE BOX
Neurology - Consult Note    -  Spectra: [id number] ([facility]), [phone] ([facility])  History obtained by Sinhala speaking resident   -    HPI: Patient MARCY MENDOZA is a 73y (1950) man with a PMHx significant for stroke (L hemipareis, speech disturbance) in June 2023 with no residual deficits, CAD s/p stents on plavix, HTN, HLD, pre-DM, BPH, chronic intermittent dizziness on Meclizine, who presents with acute worsening of dizziness.  Patient states yesterday he stood up for prayer, when he suddenly felt dizzy and generalized weakness. He was unstable but caught by family member and did not fall or have headstrike. Describes it as a self spinning sensation. He has had periods of intermittent dizziness which are worse with movement since his stroke earlier this year. He was given Meclizine by his PCP. Patient ran out of meclizine yesterday. Reports the meclizine only mildly improved his symptoms. Dizziness could come on while lying supine, but usually worse with any movement including standing up and walking. He ambulates independently at baseline Denies nausea, vomitting, blurred vision, double vision or other focal neurologic deficits. He denies any prodromal symptoms, SOB, palpitations. Son reports patient's BP can range from SBP 140s-180s at home. He has baseline mild hearing loss, denies tinnitus or ear infection.  Also endorses chronic intermittent mild head pressure and burning sensation at soles of B/L feet. Patient takes Plavix daily.  Son/patient are not sure what etiology of stroke was, report the stroke happened in [facility] 6 months ago but he has not followed with a Neurologist.     FSG 98, /84, HR 58      Review of Systems:    CONSTITUTIONAL: No fevers or chills  EYES AND ENT: No visual changes or no throat pain   NECK: No pain or stiffness  RESPIRATORY: No hemoptysis or shortness of breath  CARDIOVASCULAR: No chest pain or palpitations  GASTROINTESTINAL: No melena or hematochezia  GENITOURINARY: No dysuria or hematuria  NEUROLOGICAL: +As stated in HPI above  SKIN: No itching, burning, rashes, or lesions   All other review of systems is negative unless indicated above.    Allergies:  No Known Allergies      PMHx/PSHx/Family Hx: As above, otherwise see below   CVA (cerebrovascular accident)  CAD (coronary artery disease)  HTN (hypertension)  HLD (hyperlipidemia)      Social Hx:  No current use of tobacco, alcohol, or illicit drugs  Lives with son  Ambulates independently at baseline    Medications:  MEDICATIONS  (STANDING):    MEDICATIONS  (PRN):      Vitals:  T(C): 36.5 (12-27-23 @ 13:10), Max: 36.8 (12-27-23 @ 10:19)  HR: 56 (12-27-23 @ 13:10) (56 - 58)  BP: 148/84 (12-27-23 @ 13:10) (131/69 - 148/84)  RR: 16 (12-27-23 @ 13:10) (16 - 16)  SpO2: 96% (12-27-23 @ 13:10) (96% - 98%)    Physical Examination:   General - NAD  Cardiovascular - Peripheral pulses palpable, no edema  Eyes -  Fundoscopy not performed due to safety precautions in the setting of the COVID-19 pandemic  Head impulse with corrective saccade on right head turn   No skew deviation  No nystagmus noted     Neurologic Exam:  Mental status - Awake, Alert, Oriented to person, place, and time. Speech fluent, repetition and naming intact. Follows simple and complex commands. Attention/concentration, recent and remote memory (including registration and recall), and fund of knowledge intact    Cranial nerves - PERRLA, VFF, EOMI, face sensation (V1-V3) intact b/l, facial strength intact without asymmetry b/l, hearing intact b/l, palate with symmetric elevation, trapezius 5/5 strength b/l, tongue midline on protrusion with full lateral movement    Motor - Normal bulk and tone throughout. No pronator drift.  Strength testing            Deltoid      Biceps      Triceps     Wrist Extension    Wrist Flexion     Interossei         R            5                 5               5                     5                   5                        5                 5  L             5                 5               5                     5                   5                        5                 5              Hip Flexion    Hip Extension    Knee Flexion    Knee Extension    Dorsiflexion    Plantar Flexion  R              5                           5                       5               5                   5                          5  L              5                           5                        5               5                  5                          5    Sensation - Light touch intact throughout    DTR's -             Biceps      Triceps     Brachioradialis      Patellar    Ankle    Toes/plantar response  R             1+             0+                  1+               0+            0+                Mute  L              1+             0+                 1+                0+           0+                Mute    Coordination - Finger to Nose and Heel-shin intact b/l. No tremors appreciated    Gait and station - Guarded gait, decreased arm swing. No overt ataxia noted     Labs:                        14.8   6.44  )-----------( 214      ( 27 Dec 2023 11:45 )             44.8     12-27    140  |  104  |  8   ----------------------------<  101<H>  4.6   |  27  |  0.95    Ca    9.2      27 Dec 2023 11:45    TPro  7.4  /  Alb  4.3  /  TBili  0.5  /  DBili  x   /  AST  28  /  ALT  35  /  AlkPhos  55  12-27    CAPILLARY BLOOD GLUCOSE      POCT Blood Glucose.: 98 mg/dL (27 Dec 2023 10:27)    LIVER FUNCTIONS - ( 27 Dec 2023 11:45 )  Alb: 4.3 g/dL / Pro: 7.4 g/dL / ALK PHOS: 55 U/L / ALT: 35 U/L / AST: 28 U/L / GGT: x               Radiology:  CT Angio Neck w/ IV Cont (12.27.23 @ 12:54)     Head CT: There is no acute intracranial hemorrhage, mass effect, shift of   the midline structures, herniation, extra-axial fluid collection, or   hydrocephalus.No abnormal intracranial enhancement is seen on the delayed postcontrast   series.  There is diffuse cerebral volume loss with prominence of the sulci,   fissures, and cisternal spaces which is normal for the patient's age.   There is mild deep and periventricular white matter hypoattenuation   statistically compatible with microvascular changes given calcific   atherosclerotic disease of the intracranial arteries.  The paranasal sinuses and tympanomastoid cavities are clear. The   calvarium is intact. There is evidence of bilateral cataract removal.    CTA NECK: There is a standard anatomic configuration to the aortic arch.  The origins of the great vessels appear unremarkable. The bilateral   common carotid arteries and carotid bifurcations appear unremarkable.  The bilateral cervical internal carotid arteries are within normal limits.  The origins of the bilateral vertebral arteries are normal. The bilateral   cervical vertebral arteries are normal in course and caliber.    CTA HEAD: Calcified plaques affect the bilateral carotid siphons without   associated stenosis. Otherwise, the bilateral intracranial internal   carotid, anterior, and middle cerebral arteries appear unremarkable.  The anterior and bilateral posterior communicating arteries are notable.  The bilateral intradural vertebral arteries, vertebrobasilar junction,   basilar artery, and basilar tip appear unremarkable as well as the   bilateral posterior cerebral arteries.  The imaged intracranial venous structures are patent.    IMPRESSION:    Head CT: No acute intracranial hemorrhage, mass effect, or shift of the   midline structures.    CTA head and neck: No large vessel occlusion or major stenosis.     Neurology - Consult Note    -  Spectra: [phone] ([facility]), [phone] ([facility])  History obtained by Spanish speaking resident   -    HPI: Patient MARCY MENDOZA is a 73y (1950) man with a PMHx significant for stroke (L hemipareis, speech disturbance) in June 2023 with no residual deficits, CAD s/p stents on plavix, HTN, HLD, pre-DM, BPH, chronic intermittent dizziness on Meclizine, who presents with acute worsening of dizziness.  Patient states yesterday he stood up for prayer, when he suddenly felt dizzy and generalized weakness. He was unstable but caught by family member and did not fall or have headstrike. Describes it as a self spinning sensation. He has had periods of intermittent dizziness which are worse with movement since his stroke earlier this year. He was given Meclizine by his PCP. Patient ran out of meclizine yesterday. Reports the meclizine only mildly improved his symptoms. Dizziness could come on while lying supine, but usually worse with any movement including standing up and walking. He ambulates independently at baseline Denies nausea, vomitting, blurred vision, double vision or other focal neurologic deficits. He denies any prodromal symptoms, SOB, palpitations. Son reports patient's BP can range from SBP 140s-180s at home. He has baseline mild hearing loss, denies tinnitus or ear infection.  Also endorses chronic intermittent mild head pressure and burning sensation at soles of B/L feet. Patient takes Plavix daily.  Son/patient are not sure what etiology of stroke was, report the stroke happened in [facility] 6 months ago but he has not followed with a Neurologist.     FSG 98, /84, HR 58      Review of Systems:    CONSTITUTIONAL: No fevers or chills  EYES AND ENT: No visual changes or no throat pain   NECK: No pain or stiffness  RESPIRATORY: No hemoptysis or shortness of breath  CARDIOVASCULAR: No chest pain or palpitations  GASTROINTESTINAL: No melena or hematochezia  GENITOURINARY: No dysuria or hematuria  NEUROLOGICAL: +As stated in HPI above  SKIN: No itching, burning, rashes, or lesions   All other review of systems is negative unless indicated above.    Allergies:  No Known Allergies      PMHx/PSHx/Family Hx: As above, otherwise see below   CVA (cerebrovascular accident)  CAD (coronary artery disease)  HTN (hypertension)  HLD (hyperlipidemia)      Social Hx:  No current use of tobacco, alcohol, or illicit drugs  Lives with son  Ambulates independently at baseline    Medications:  MEDICATIONS  (STANDING):    MEDICATIONS  (PRN):      Vitals:  T(C): 36.5 (12-27-23 @ 13:10), Max: 36.8 (12-27-23 @ 10:19)  HR: 56 (12-27-23 @ 13:10) (56 - 58)  BP: 148/84 (12-27-23 @ 13:10) (131/69 - 148/84)  RR: 16 (12-27-23 @ 13:10) (16 - 16)  SpO2: 96% (12-27-23 @ 13:10) (96% - 98%)    Physical Examination:   General - NAD  Cardiovascular - Peripheral pulses palpable, no edema  Eyes -  Fundoscopy not performed due to safety precautions in the setting of the COVID-19 pandemic  Head impulse with corrective saccade on right head turn   No skew deviation  No nystagmus noted     Neurologic Exam:  Mental status - Awake, Alert, Oriented to person, place, and time. Speech fluent, repetition and naming intact. Follows simple and complex commands. Attention/concentration, recent and remote memory (including registration and recall), and fund of knowledge intact    Cranial nerves - PERRLA, VFF, EOMI, face sensation (V1-V3) intact b/l, facial strength intact without asymmetry b/l, hearing intact b/l, palate with symmetric elevation, trapezius 5/5 strength b/l, tongue midline on protrusion with full lateral movement    Motor - Normal bulk and tone throughout. No pronator drift.  Strength testing            Deltoid      Biceps      Triceps     Wrist Extension    Wrist Flexion     Interossei         R            5                 5               5                     5                   5                        5                 5  L             5                 5               5                     5                   5                        5                 5              Hip Flexion    Hip Extension    Knee Flexion    Knee Extension    Dorsiflexion    Plantar Flexion  R              5                           5                       5               5                   5                          5  L              5                           5                        5               5                  5                          5    Sensation - Light touch intact throughout    DTR's -             Biceps      Triceps     Brachioradialis      Patellar    Ankle    Toes/plantar response  R             1+             0+                  1+               0+            0+                Mute  L              1+             0+                 1+                0+           0+                Mute    Coordination - Finger to Nose and Heel-shin intact b/l. No tremors appreciated    Gait and station - Guarded gait, decreased arm swing. No overt ataxia noted     Labs:                        14.8   6.44  )-----------( 214      ( 27 Dec 2023 11:45 )             44.8     12-27    140  |  104  |  8   ----------------------------<  101<H>  4.6   |  27  |  0.95    Ca    9.2      27 Dec 2023 11:45    TPro  7.4  /  Alb  4.3  /  TBili  0.5  /  DBili  x   /  AST  28  /  ALT  35  /  AlkPhos  55  12-27    CAPILLARY BLOOD GLUCOSE      POCT Blood Glucose.: 98 mg/dL (27 Dec 2023 10:27)    LIVER FUNCTIONS - ( 27 Dec 2023 11:45 )  Alb: 4.3 g/dL / Pro: 7.4 g/dL / ALK PHOS: 55 U/L / ALT: 35 U/L / AST: 28 U/L / GGT: x               Radiology:  CT Angio Neck w/ IV Cont (12.27.23 @ 12:54)     Head CT: There is no acute intracranial hemorrhage, mass effect, shift of   the midline structures, herniation, extra-axial fluid collection, or   hydrocephalus.No abnormal intracranial enhancement is seen on the delayed postcontrast   series.  There is diffuse cerebral volume loss with prominence of the sulci,   fissures, and cisternal spaces which is normal for the patient's age.   There is mild deep and periventricular white matter hypoattenuation   statistically compatible with microvascular changes given calcific   atherosclerotic disease of the intracranial arteries.  The paranasal sinuses and tympanomastoid cavities are clear. The   calvarium is intact. There is evidence of bilateral cataract removal.    CTA NECK: There is a standard anatomic configuration to the aortic arch.  The origins of the great vessels appear unremarkable. The bilateral   common carotid arteries and carotid bifurcations appear unremarkable.  The bilateral cervical internal carotid arteries are within normal limits.  The origins of the bilateral vertebral arteries are normal. The bilateral   cervical vertebral arteries are normal in course and caliber.    CTA HEAD: Calcified plaques affect the bilateral carotid siphons without   associated stenosis. Otherwise, the bilateral intracranial internal   carotid, anterior, and middle cerebral arteries appear unremarkable.  The anterior and bilateral posterior communicating arteries are notable.  The bilateral intradural vertebral arteries, vertebrobasilar junction,   basilar artery, and basilar tip appear unremarkable as well as the   bilateral posterior cerebral arteries.  The imaged intracranial venous structures are patent.    IMPRESSION:    Head CT: No acute intracranial hemorrhage, mass effect, or shift of the   midline structures.    CTA head and neck: No large vessel occlusion or major stenosis.

## 2023-12-27 NOTE — ED ADULT NURSE NOTE - NSFALLUNIVINTERV_ED_ALL_ED
Bed/Stretcher in lowest position, wheels locked, appropriate side rails in place/Call bell, personal items and telephone in reach/Instruct patient to call for assistance before getting out of bed/chair/stretcher/Non-slip footwear applied when patient is off stretcher/[place] to call system/Physically safe environment - no spills, clutter or unnecessary equipment/Purposeful proactive rounding/Room/bathroom lighting operational, light cord in reach Bed/Stretcher in lowest position, wheels locked, appropriate side rails in place/Call bell, personal items and telephone in reach/Instruct patient to call for assistance before getting out of bed/chair/stretcher/Non-slip footwear applied when patient is off stretcher/[place] to call system/Physically safe environment - no spills, clutter or unnecessary equipment/Purposeful proactive rounding/Room/bathroom lighting operational, light cord in reach

## 2023-12-27 NOTE — ED CDU PROVIDER INITIAL DAY NOTE - OBJECTIVE STATEMENT
73y (1950) man with a PMHx significant for stroke (L hemipareis, speech disturbance) in June 2023 with no residual deficits, CAD s/p stents on plavix, HTN, HLD, pre-DM, BPH, chronic intermittent dizziness on Meclizine, who presents with acute worsening of dizziness.  Patient states yesterday he stood up for prayer, when he suddenly felt dizzy and generalized weakness. He was unstable but caught by family member and did not fall or have headstrike. Describes it as a self spinning sensation. He has had periods of intermittent dizziness which are worse with movement since his stroke earlier this year. He was given Meclizine by his PCP. Patient ran out of meclizine yesterday. Reports the meclizine only mildly improved his symptoms. Dizziness could come on while lying supine, but usually worse with any movement including standing up and walking. He ambulates independently at baseline Denies nausea, vomitting, blurred vision, double vision or other focal neurologic deficits. He denies any prodromal symptoms, SOB, palpitations. Son reports patient's BP can range from SBP 140s-180s at home. He has baseline mild hearing loss, denies tinnitus or ear infection.  Also endorses chronic intermittent mild head pressure and burning sensation at soles of B/L feet. Patient takes Plavix daily.  Son/patient are not sure what etiology of stroke was, report the stroke happened in [facility] 6 months ago but he has not followed with a Neurologist.    CDU BESSIE De Leon: Agree with above. At present, pt notes symptoms mildly improved but not fully resolved. In the emergency department patient underwent lab analysis and CTs.  Labs unremarkable.  CTA head/neck without acute intracranial hemorrhage, mass effect, or shift of midline structures nor large vessel acute occlusion or major stenosis.  Patient placed in CDU for 73y (1950) man with a PMHx significant for stroke (L hemipareis, speech disturbance) in June 2023 with no residual deficits, CAD s/p stents on plavix, HTN, HLD, pre-DM, BPH, chronic intermittent dizziness on Meclizine, who presents with acute worsening of dizziness.  Patient states yesterday he stood up for prayer, when he suddenly felt dizzy and generalized weakness. He was unstable but caught by family member and did not fall or have headstrike. Describes it as a self spinning sensation. He has had periods of intermittent dizziness which are worse with movement since his stroke earlier this year. He was given Meclizine by his PCP. Patient ran out of meclizine yesterday. Reports the meclizine only mildly improved his symptoms. Dizziness could come on while lying supine, but usually worse with any movement including standing up and walking. He ambulates independently at baseline Denies nausea, vomitting, blurred vision, double vision or other focal neurologic deficits. He denies any prodromal symptoms, SOB, palpitations. Son reports patient's BP can range from SBP 140s-180s at home. He has baseline mild hearing loss, denies tinnitus or ear infection.  Also endorses chronic intermittent mild head pressure and burning sensation at soles of B/L feet. Patient takes Plavix daily.  Son/patient are not sure what etiology of stroke was, report the stroke happened in [facility] 6 months ago but he has not followed with a Neurologist.    CDU BESSIE De Leon: Agree with above. At present, pt notes symptoms mildly improved but not fully resolved. In the emergency department patient underwent lab analysis and CTs.  Labs unremarkable.  CTA head/neck without acute intracranial hemorrhage, mass effect, or shift of midline structures nor large vessel acute occlusion or major stenosis.  Patient placed in CDU for 72 y/o man with a PMHx significant for stroke (L hemipareis, speech disturbance) in June 2023 with no residual deficits, CAD s/p stents on plavix, HTN, HLD, pre-DM, BPH, chronic intermittent dizziness on Meclizine, who presents with acute worsening of dizziness.  Patient states yesterday he stood up for prayer, when he suddenly felt dizzy and generalized weakness. He was unstable but caught by family member and did not fall or have head strike. Describes it as a self spinning sensation. He has had periods of intermittent dizziness which are worse with movement since his stroke earlier this year. He was given Meclizine by his PCP. Patient ran out of meclizine yesterday. Reports the meclizine only mildly improved his symptoms. Dizziness could come on while lying supine, but usually worse with any movement including standing up and walking. He ambulates independently at baseline Denies nausea, vomiting, blurred vision, double vision or other focal neurologic deficits. He denies any prodromal symptoms, SOB, palpitations. Son reports patient's BP can range from SBP 140s-180s at home. He has baseline mild hearing loss, denies tinnitus or ear infection.  Also endorses chronic intermittent mild head pressure and burning sensation at soles of B/L feet. Patient takes Plavix daily.  Son/patient are not sure what etiology of stroke was, report the stroke happened in [facility] 6 months ago but he has not followed with a Neurologist.    CDU BESSIE De Leon: Agree with above. At present, pt notes symptoms mildly improved but not fully resolved. In the emergency department patient underwent lab analysis and CTs.  Labs unremarkable.  CTA head/neck without acute intracranial hemorrhage, mass effect, or shift of midline structures nor large vessel acute occlusion or major stenosis.  Patient placed in CDU for MRI Brain and IACs, tele monitoring, and frequent reassessment. 74 y/o man with a PMHx significant for stroke (L hemipareis, speech disturbance) in June 2023 with no residual deficits, CAD s/p stents on plavix, HTN, HLD, pre-DM, BPH, chronic intermittent dizziness on Meclizine, who presents with acute worsening of dizziness.  Patient states yesterday he stood up for prayer, when he suddenly felt dizzy and generalized weakness. He was unstable but caught by family member and did not fall or have head strike. Describes it as a self spinning sensation. He has had periods of intermittent dizziness which are worse with movement since his stroke earlier this year. He was given Meclizine by his PCP. Patient ran out of meclizine yesterday. Reports the meclizine only mildly improved his symptoms. Dizziness could come on while lying supine, but usually worse with any movement including standing up and walking. He ambulates independently at baseline Denies nausea, vomiting, blurred vision, double vision or other focal neurologic deficits. He denies any prodromal symptoms, SOB, palpitations. Son reports patient's BP can range from SBP 140s-180s at home. He has baseline mild hearing loss, denies tinnitus or ear infection.  Also endorses chronic intermittent mild head pressure and burning sensation at soles of B/L feet. Patient takes Plavix daily.  Son/patient are not sure what etiology of stroke was, report the stroke happened in [facility] 6 months ago but he has not followed with a Neurologist.    CDU BESSIE De Leon: Agree with above. At present, pt notes symptoms mildly improved but not fully resolved. In the emergency department patient underwent lab analysis and CTs.  Labs unremarkable.  CTA head/neck without acute intracranial hemorrhage, mass effect, or shift of midline structures nor large vessel acute occlusion or major stenosis.  Patient placed in CDU for MRI Brain and IACs, tele monitoring, and frequent reassessment.

## 2023-12-27 NOTE — ED PROVIDER NOTE - CLINICAL SUMMARY MEDICAL DECISION MAKING FREE TEXT BOX
73yM w/pmhx CVA (6 months ago), CAD w/stents on plavix, HTN, HLD, pre-DM (on metformin), BPH presenting to the ED with dizziness and difficulty walking since yesterday. On exam pt is well appearing, afebrile, non focal neuro exam, cautious gait with assistance. Concern for vertigo, cva (given recent hx). Plan: cbc/cmp, trop, CTa head/neck, trial meclizine.

## 2023-12-27 NOTE — ED ADULT TRIAGE NOTE - CHIEF COMPLAINT QUOTE
Patient c/o dizziness x 2 weeks. Patient finished his prescription for meclizine yesterday. Also c/o dysuria. Denies fever, abdominal pain nausea, vomiting, chest pain, SOB. Phx CVA, HTN, CAD w/ stent, BPH

## 2023-12-27 NOTE — ED CDU PROVIDER INITIAL DAY NOTE - ATTENDING APP SHARED VISIT CONTRIBUTION OF CARE
Dr. Max:  I performed a face to face bedside interview with patient regarding history of present illness, review of symptoms and past medical history. I completed an independent physical exam.  I have discussed patient's plan of care with PA.   I agree with note as stated above, having amended the EMR as needed to reflect my findings.   This includes HISTORY OF PRESENT ILLNESS, HIV, PAST MEDICAL/SURGICAL/FAMILY/SOCIAL HISTORY, ALLERGIES AND HOME MEDICATIONS, REVIEW OF SYSTEMS, PHYSICAL EXAM, and any PROGRESS NOTES during the time I functioned as the attending physician for this patient.    73M h/[place] CVA [place], [place] was unable to speak and symptoms resolved), CAD on Plavix, HTN, HLD, pre-DM, BPH, presents c/o dizziness which he describes as worse when standing up and feels like room spinning.  States he's had intermittent dizziness since his stroke, was prescribed meclizine.  Also c/o pressure sensation to top of head.    Exam:  - nad  - rrr  - ctab  - abd soft ntnd  - no focal neuro deficits    A/P  - dizziness consistent with vertigo, eval peripheral vs centra  - cdu for mri, appreciate neuro recs

## 2023-12-28 VITALS
DIASTOLIC BLOOD PRESSURE: 70 MMHG | OXYGEN SATURATION: 94 % | TEMPERATURE: 98 F | SYSTOLIC BLOOD PRESSURE: 122 MMHG | RESPIRATION RATE: 16 BRPM | HEART RATE: 68 BPM

## 2023-12-28 LAB
B PERT DNA SPEC QL NAA+PROBE: SIGNIFICANT CHANGE UP
B PERT DNA SPEC QL NAA+PROBE: SIGNIFICANT CHANGE UP
B PERT+PARAPERT DNA PNL SPEC NAA+PROBE: SIGNIFICANT CHANGE UP
B PERT+PARAPERT DNA PNL SPEC NAA+PROBE: SIGNIFICANT CHANGE UP
BORDETELLA PARAPERTUSSIS (RAPRVP): SIGNIFICANT CHANGE UP
BORDETELLA PARAPERTUSSIS (RAPRVP): SIGNIFICANT CHANGE UP
C PNEUM DNA SPEC QL NAA+PROBE: SIGNIFICANT CHANGE UP
C PNEUM DNA SPEC QL NAA+PROBE: SIGNIFICANT CHANGE UP
CULTURE RESULTS: SIGNIFICANT CHANGE UP
CULTURE RESULTS: SIGNIFICANT CHANGE UP
FLUAV SUBTYP SPEC NAA+PROBE: SIGNIFICANT CHANGE UP
FLUAV SUBTYP SPEC NAA+PROBE: SIGNIFICANT CHANGE UP
FLUBV RNA SPEC QL NAA+PROBE: SIGNIFICANT CHANGE UP
FLUBV RNA SPEC QL NAA+PROBE: SIGNIFICANT CHANGE UP
HADV DNA SPEC QL NAA+PROBE: SIGNIFICANT CHANGE UP
HADV DNA SPEC QL NAA+PROBE: SIGNIFICANT CHANGE UP
HCOV 229E RNA SPEC QL NAA+PROBE: SIGNIFICANT CHANGE UP
HCOV 229E RNA SPEC QL NAA+PROBE: SIGNIFICANT CHANGE UP
HCOV HKU1 RNA SPEC QL NAA+PROBE: SIGNIFICANT CHANGE UP
HCOV HKU1 RNA SPEC QL NAA+PROBE: SIGNIFICANT CHANGE UP
HCOV NL63 RNA SPEC QL NAA+PROBE: SIGNIFICANT CHANGE UP
HCOV NL63 RNA SPEC QL NAA+PROBE: SIGNIFICANT CHANGE UP
HCOV OC43 RNA SPEC QL NAA+PROBE: SIGNIFICANT CHANGE UP
HCOV OC43 RNA SPEC QL NAA+PROBE: SIGNIFICANT CHANGE UP
HMPV RNA SPEC QL NAA+PROBE: SIGNIFICANT CHANGE UP
HMPV RNA SPEC QL NAA+PROBE: SIGNIFICANT CHANGE UP
HPIV1 RNA SPEC QL NAA+PROBE: SIGNIFICANT CHANGE UP
HPIV1 RNA SPEC QL NAA+PROBE: SIGNIFICANT CHANGE UP
HPIV2 RNA SPEC QL NAA+PROBE: SIGNIFICANT CHANGE UP
HPIV2 RNA SPEC QL NAA+PROBE: SIGNIFICANT CHANGE UP
HPIV3 RNA SPEC QL NAA+PROBE: SIGNIFICANT CHANGE UP
HPIV3 RNA SPEC QL NAA+PROBE: SIGNIFICANT CHANGE UP
HPIV4 RNA SPEC QL NAA+PROBE: SIGNIFICANT CHANGE UP
HPIV4 RNA SPEC QL NAA+PROBE: SIGNIFICANT CHANGE UP
M PNEUMO DNA SPEC QL NAA+PROBE: SIGNIFICANT CHANGE UP
M PNEUMO DNA SPEC QL NAA+PROBE: SIGNIFICANT CHANGE UP
RAPID RVP RESULT: SIGNIFICANT CHANGE UP
RAPID RVP RESULT: SIGNIFICANT CHANGE UP
RSV RNA SPEC QL NAA+PROBE: SIGNIFICANT CHANGE UP
RSV RNA SPEC QL NAA+PROBE: SIGNIFICANT CHANGE UP
RV+EV RNA SPEC QL NAA+PROBE: SIGNIFICANT CHANGE UP
RV+EV RNA SPEC QL NAA+PROBE: SIGNIFICANT CHANGE UP
SARS-COV-2 RNA SPEC QL NAA+PROBE: SIGNIFICANT CHANGE UP
SARS-COV-2 RNA SPEC QL NAA+PROBE: SIGNIFICANT CHANGE UP
SPECIMEN SOURCE: SIGNIFICANT CHANGE UP
SPECIMEN SOURCE: SIGNIFICANT CHANGE UP
TROPONIN T, HIGH SENSITIVITY RESULT: 8 NG/L — SIGNIFICANT CHANGE UP
TROPONIN T, HIGH SENSITIVITY RESULT: 8 NG/L — SIGNIFICANT CHANGE UP

## 2023-12-28 PROCEDURE — 99285 EMERGENCY DEPT VISIT HI MDM: CPT

## 2023-12-28 PROCEDURE — 70553 MRI BRAIN STEM W/O & W/DYE: CPT | Mod: 26,MA

## 2023-12-28 PROCEDURE — 99239 HOSP IP/OBS DSCHRG MGMT >30: CPT

## 2023-12-28 RX ORDER — MECLIZINE HCL 12.5 MG
12.5 TABLET ORAL THREE TIMES A DAY
Refills: 0 | Status: DISCONTINUED | OUTPATIENT
Start: 2023-12-28 | End: 2023-12-30

## 2023-12-28 RX ADMIN — LISINOPRIL 2.5 MILLIGRAM(S): 2.5 TABLET ORAL at 06:05

## 2023-12-28 RX ADMIN — FINASTERIDE 5 MILLIGRAM(S): 5 TABLET, FILM COATED ORAL at 09:52

## 2023-12-28 RX ADMIN — AMLODIPINE BESYLATE 2.5 MILLIGRAM(S): 2.5 TABLET ORAL at 06:06

## 2023-12-28 RX ADMIN — TAMSULOSIN HYDROCHLORIDE 0.4 MILLIGRAM(S): 0.4 CAPSULE ORAL at 00:22

## 2023-12-28 RX ADMIN — ATORVASTATIN CALCIUM 40 MILLIGRAM(S): 80 TABLET, FILM COATED ORAL at 00:22

## 2023-12-28 RX ADMIN — CLOPIDOGREL BISULFATE 75 MILLIGRAM(S): 75 TABLET, FILM COATED ORAL at 09:52

## 2023-12-28 NOTE — ED CDU PROVIDER DISPOSITION NOTE - ATTENDING CONTRIBUTION TO CARE
I have personally performed a face to face medical and diagnostic evaluation of the patient. I have discussed with and reviewed the AVIS's note and agree with the History, ROS, Physical Exam and MDM unless otherwise indicated. A brief summary of my personal evaluation and impression can be found below.    Please see CDU Subsequent day note for further details.

## 2023-12-28 NOTE — ED CDU PROVIDER DISPOSITION NOTE - CARE PROVIDER_API CALL
Chauncey Massey  Neurology  [address] [facility] [address]  [place] [address]  Phone: [phone]  Fax: [phone]  Follow Up Time: 7-10 Days   Chauncey Massey  Neurology  [address] [facility] [address]  [place] [address]  Phone: [phone]  Fax: [phone]  Follow Up Time: 7-10 Days

## 2023-12-28 NOTE — ED CDU PROVIDER DISPOSITION NOTE - NSDCPRINTRESULTS_ED_ALL_ED
normal... Patient requests all Lab, Cardiology, and Radiology Results on their Discharge Instructions

## 2023-12-28 NOTE — ED CDU PROVIDER DISPOSITION NOTE - NSFOLLOWUPCLINICS_GEN_ALL_ED_FT
[facility]  Neurology Autoimmune Encephalitis  [address] [place] [address]  Phone: [phone]  Fax: [phone]     [facility]  Neurology [facility] En[facility]  [address] [place] [address]  Phone: [phone]  Fax: [phone]

## 2023-12-28 NOTE — ED CDU PROVIDER SUBSEQUENT DAY NOTE - ATTENDING APP SHARED VISIT CONTRIBUTION OF CARE
I have personally performed a face to face medical and diagnostic evaluation of the patient. I have discussed with and reviewed the AVIS's note and agree with the History, ROS, Physical Exam and MDM unless otherwise indicated. A brief summary of my personal evaluation and impression can be found below.    Vikik NAVA: 72 y/o man with a PMHx significant for stroke (L hemipareis, speech disturbance) in June 2023 with no residual deficits, CAD s/p stents on plavix, HTN, HLD, pre-DM, BPH, chronic intermittent dizziness on Meclizine, who presents with acute worsening of dizziness. In the emergency department patient underwent lab analysis and CTs.  Labs unremarkable.  CTA head/neck without acute intracranial hemorrhage, mass effect, or shift of midline structures nor large vessel acute occlusion or major stenosis.    No acute events overnight. Labs and imaging reviewed. Pt reports still having some dizziness, appears to be provoked by changing body positions, resolves with rest, however does note L sided head discomfort  that is persisting, no fever. Denies numbness, tingling or loss of sensation. Denies loss of motor function. Pt pending MRI, neuro following.     All other ROS negative, except as above and as per HPI and ROS section.    VITALS: Initial triage and subsequent vitals have been reviewed by me.  GEN APPEARANCE: Alert, non-toxic, well-appearing, NAD.  HEAD: Atraumatic.  EYES: PERRLa, EOMI, vision grossly intact.   NECK: Supple  CV: RRR, S1S2, no c/r/m/g. Cap refill <2 seconds. No bruits.   LUNGS: CTAB. No abnormal breath sounds.  ABDOMEN: Soft, NTND. No guarding or rebound.   MSK/EXT: No spinal or extremity point tenderness. No CVA ttp. Pelvis stable. No peripheral edema.  NEURO: Alert, follows commands. Weight bearing normal. Speech normal. Sensation and motor at baseline x4 extremities.   SKIN: Warm, dry and intact. No rash.  PSYCH: Appropriate    Plan/MDM: exam vss non toxic PE as above ddx suspect likely peripheral vertigo, labs and imaging reviewed, pt pending MR this morning, will reassess and dispo according pending mri and reassessment. I have personally performed a face to face medical and diagnostic evaluation of the patient. I have discussed with and reviewed the AVIS's note and agree with the History, ROS, Physical Exam and MDM unless otherwise indicated. A brief summary of my personal evaluation and impression can be found below.    Vikki NAVA: 74 y/o man with a PMHx significant for stroke (L hemipareis, speech disturbance) in June 2023 with no residual deficits, CAD s/p stents on plavix, HTN, HLD, pre-DM, BPH, chronic intermittent dizziness on Meclizine, who presents with acute worsening of dizziness. In the emergency department patient underwent lab analysis and CTs.  Labs unremarkable.  CTA head/neck without acute intracranial hemorrhage, mass effect, or shift of midline structures nor large vessel acute occlusion or major stenosis.    No acute events overnight. Labs and imaging reviewed. Pt reports still having some dizziness, appears to be provoked by changing body positions, resolves with rest, however does note L sided head discomfort  that is persisting, no fever. Denies numbness, tingling or loss of sensation. Denies loss of motor function. Pt pending MRI, neuro following.     All other ROS negative, except as above and as per HPI and ROS section.    VITALS: Initial triage and subsequent vitals have been reviewed by me.  GEN APPEARANCE: Alert, non-toxic, well-appearing, NAD.  HEAD: Atraumatic.  EYES: PERRLa, EOMI, vision grossly intact.   NECK: Supple  CV: RRR, S1S2, no c/r/m/g. Cap refill <2 seconds. No bruits.   LUNGS: CTAB. No abnormal breath sounds.  ABDOMEN: Soft, NTND. No guarding or rebound.   MSK/EXT: No spinal or extremity point tenderness. No CVA ttp. Pelvis stable. No peripheral edema.  NEURO: Alert, follows commands. Weight bearing normal. Speech normal. Sensation and motor at baseline x4 extremities.   SKIN: Warm, dry and intact. No rash.  PSYCH: Appropriate    Plan/MDM: exam vss non toxic PE as above ddx suspect likely peripheral vertigo, labs and imaging reviewed, pt pending MR this morning, will reassess and dispo according pending mri and reassessment.

## 2023-12-28 NOTE — ED CDU PROVIDER SUBSEQUENT DAY NOTE - CLINICAL SUMMARY MEDICAL DECISION MAKING FREE TEXT BOX
72 y/o man with a PMHx significant for stroke (L hemipareis, speech disturbance) in June 2023 with no residual deficits, CAD s/p stents on plavix, HTN, HLD, pre-DM, BPH, chronic intermittent dizziness on Meclizine, who presents with acute worsening of dizziness. In the emergency department patient underwent lab analysis and CTs.  Labs unremarkable.  CTA head/neck without acute intracranial hemorrhage, mass effect, or shift of midline structures nor large vessel acute occlusion or major stenosis.  Patient placed in CDU for MRI Brain and IACs, tele monitoring, and frequent reassessment. 74 y/o man with a PMHx significant for stroke (L hemipareis, speech disturbance) in June 2023 with no residual deficits, CAD s/p stents on plavix, HTN, HLD, pre-DM, BPH, chronic intermittent dizziness on Meclizine, who presents with acute worsening of dizziness. In the emergency department patient underwent lab analysis and CTs.  Labs unremarkable.  CTA head/neck without acute intracranial hemorrhage, mass effect, or shift of midline structures nor large vessel acute occlusion or major stenosis.  Patient placed in CDU for MRI Brain and IACs, tele monitoring, and frequent reassessment.

## 2023-12-28 NOTE — ED CDU PROVIDER DISPOSITION NOTE - NSFOLLOWUPINSTRUCTIONS_ED_ALL_ED_FT
See your primary care doctor within 24-48 hours.   continue to take Meclizine 12.5mg every 8 hours as needed for vertigo, avoid drinking alcohol or driving when taking this medication.  Your MRI shows a Chronic lacunar infarct within the right cerebellar hemisphere. Therefore we suspect your symptoms are likely secondary to your chronic vestibular disease.   continue your home medications.  Follow up with your ENT, as well as neurology, Dr. Massey, within 48-72 hours for further evaluation.  Follow up with [facility] for vestibular therapy.   Return to the ER for worsening dizziness, gait imbalance, extremity numbness/weakness/tingling, slurred speech, vision changes, severe headaches or any other concerns. See your primary care doctor within 24-48 hours.   continue to take Meclizine 12.5mg every 8 hours as needed for vertigo, avoid drinking alcohol or driving when taking this medication.  Your MRI shows a Chronic lacunar infarct within the right cerebellar hemisphere. Therefore we suspect your symptoms are likely secondary to your chronic vestibular disease.   continue your home medications.  Follow up with your ENT, as well as neurology, Dr. Massey, within 48-72 hours for further evaluation.  Follow up with [facility] facility for vestibular therapy.   Return to the ER for worsening dizziness, gait imbalance, extremity numbness/weakness/tingling, slurred speech, vision changes, severe headaches or any other concerns.

## 2023-12-28 NOTE — ED CDU PROVIDER SUBSEQUENT DAY NOTE - HISTORY
PT resting comfortably in no distress - headed to MRI. Will continue to monitor. PT resting comfortably in no distress - headed to MRI. Will continue to monitor.    72 y/o man with a PMHx significant for stroke (L hemipareis, speech disturbance) in June 2023 with no residual deficits, CAD s/p stents on plavix, HTN, HLD, pre-DM, BPH, chronic intermittent dizziness on Meclizine, who presents with acute worsening of dizziness. In the emergency department patient underwent lab analysis and CTs.  Labs unremarkable.  CTA head/neck without acute intracranial hemorrhage, mass effect, or shift of midline structures nor large vessel acute occlusion or major stenosis. PT resting comfortably in no distress - headed to MRI. Will continue to monitor.    74 y/o man with a PMHx significant for stroke (L hemipareis, speech disturbance) in June 2023 with no residual deficits, CAD s/p stents on plavix, HTN, HLD, pre-DM, BPH, chronic intermittent dizziness on Meclizine, who presents with acute worsening of dizziness. In the emergency department patient underwent lab analysis and CTs.  Labs unremarkable.  CTA head/neck without acute intracranial hemorrhage, mass effect, or shift of midline structures nor large vessel acute occlusion or major stenosis.

## 2023-12-28 NOTE — ED CDU PROVIDER DISPOSITION NOTE - PROVIDER TOKENS
PROVIDER:[TOKEN:[[id number]],FOLLOWUP:[7-10 Days]] PROVIDER:[TOKEN:[[id number]],FOLLOWUP:[7-10 Days]]

## 2023-12-28 NOTE — ED CDU PROVIDER DISPOSITION NOTE - PATIENT PORTAL LINK FT
You can access the FollowMyHealth Patient Portal offered by [facility] by registering at the following website: [email or web address]. By joining [employer]’s FollowMyHealth portal, you will also be able to view your health information using other applications (apps) compatible with our system. You can access the FollowMyHealth Patient Portal offered by [facility] by registering at the following website: [email or web address]. By joining [facility]’s FollowMyHealth portal, you will also be able to view your health information using other applications (apps) compatible with our system.

## 2023-12-28 NOTE — ED CDU PROVIDER SUBSEQUENT DAY NOTE - NSICDXPASTMEDICALHX_GEN_ALL_CORE_FT
PAST MEDICAL HISTORY:  CAD (coronary artery disease)     CVA (cerebrovascular accident)     History of BPH     HLD (hyperlipidemia)     HTN (hypertension)

## 2023-12-28 NOTE — ED CDU PROVIDER DISPOSITION NOTE - CLINICAL COURSE
3 y/o man with a PMHx significant for stroke (L hemipareis, speech disturbance) in June 2023 with no residual deficits, CAD s/p stents on plavix, HTN, HLD, pre-DM, BPH, chronic intermittent dizziness on Meclizine, who presents with acute worsening of dizziness. In the emergency department patient underwent lab analysis and CTs.  Labs unremarkable.  CTA head/neck without acute intracranial hemorrhage, mass effect, or shift of midline structures nor large vessel acute occlusion or major stenosis.  Pt transferred to CDU for MRI imaging. MRI revealing 3 y/o man with a PMHx significant for stroke (L hemipareis, speech disturbance) in June 2023 with no residual deficits, CAD s/p stents on plavix, HTN, HLD, pre-DM, BPH, chronic intermittent dizziness on Meclizine, who presents with acute worsening of dizziness. In the emergency department patient underwent lab analysis and CTs.  Labs unremarkable.  CTA head/neck without acute intracranial hemorrhage, mass effect, or shift of midline structures nor large vessel acute occlusion or major stenosis. Pt seen by neurology recommending MRI imaging. MRIs revealing chronic lacunar infarct in R cerebellar hemisphere with some flair hyperintensities with no CVA or large masses. pt reassessed by neuro this AM, remains ambulatory and neurologically intact, recommending vestibular therapy outpatient at [facility] rehab facility, as well as outpatient Neuro w/ dr. mondragon. pt to continue home medications. Return precautions discussed. pt verbalized understanding and is in agreement with plan. 3 y/o man with a PMHx significant for stroke (L hemipareis, speech disturbance) in June 2023 with no residual deficits, CAD s/p stents on plavix, HTN, HLD, pre-DM, BPH, chronic intermittent dizziness on Meclizine, who presents with acute worsening of dizziness. In the emergency department patient underwent lab analysis and CTs.  Labs unremarkable.  CTA head/neck without acute intracranial hemorrhage, mass effect, or shift of midline structures nor large vessel acute occlusion or major stenosis.  Pt transferred to CDU for MRI imaging. MRI revealing 3 y/o man with a PMHx significant for stroke (L hemipareis, speech disturbance) in June 2023 with no residual deficits, CAD s/p stents on plavix, HTN, HLD, pre-DM, BPH, chronic intermittent dizziness on Meclizine, who presents with acute worsening of dizziness. In the emergency department patient underwent lab analysis and CTs.  Labs unremarkable.  CTA head/neck without acute intracranial hemorrhage, mass effect, or shift of midline structures nor large vessel acute occlusion or major stenosis. Pt seen by neurology recommending MRI imaging. MRIs revealing chronic lacunar infarct in R cerebellar hemisphere with some flair hyperintensities with no CVA or large masses. pt reassessed by neuro this AM, remains ambulatory and neurologically intact, recommending vestibular therapy outpatient at [facility] rehab facility, as well as outpatient Neuro w/ dr. mondragon. pt to continue home medications. Return precautions discussed. pt verbalized understanding and is in agreement with plan. 3 y/o man with a PMHx significant for stroke (L hemipareis, speech disturbance) in June 2023 with no residual deficits, CAD s/p stents on plavix, HTN, HLD, pre-DM, BPH, chronic intermittent dizziness on Meclizine, who presents with acute worsening of dizziness. In the emergency department patient underwent lab analysis and CTs.  Labs unremarkable.  CTA head/neck without acute intracranial hemorrhage, mass effect, or shift of midline structures nor large vessel acute occlusion or major stenosis.  Pt seen by neuro recommending MRI imaging. MRIs revealing chronic lacunar infarct in R cerebellar hemisphere with some flair hyperintensities with no CVA or large masses. pt reassessed by neuro this AM, remains ambulatory and neurologically intact, recommending vestibular therapy outpatient at [facility]"" rehab facility, as well as outpatient Neuro w/ dr. mondragon. pt to continue home medications. Return precautions discussed. pt verbalized understanding and is in agreement with plan. 3 y/o man with a PMHx significant for stroke (L hemipareis, speech disturbance) in June 2023 with no residual deficits, CAD s/p stents on plavix, HTN, HLD, pre-DM, BPH, chronic intermittent dizziness on Meclizine, who presents with acute worsening of dizziness. In the emergency department patient underwent lab analysis and CTs.  Labs unremarkable.  CTA head/neck without acute intracranial hemorrhage, mass effect, or shift of midline structures nor large vessel acute occlusion or major stenosis.  Pt seen by neuro recommending MRI imaging. MRIs revealing chronic lacunar infarct in R cerebellar hemisphere with some flair hyperintensities with no CVA or large masses. pt reassessed by neuro this AM, remains ambulatory and neurologically intact, recommending vestibular therapy outpatient at [facility] rehab facility, as well as outpatient Neuro w/ dr. mondragon. pt to continue home medications. Return precautions discussed. pt verbalized understanding and is in agreement with plan.

## 2023-12-31 RX ORDER — MECLIZINE HCL 12.5 MG
1 TABLET ORAL
Qty: 12 | Refills: 0
Start: 2023-12-31 | End: 2024-01-03

## 2023-12-31 NOTE — ED POST DISCHARGE NOTE - REASON FOR FOLLOW-UP
Patient 's son said no RX at pharmacy. ED post discharge summary says to take continue to take his meclazine. Son says he has no more meclazine. Sent RX for meclazine to Patient's preferred pharmacy. Other